# Patient Record
Sex: FEMALE | Race: WHITE | Employment: FULL TIME | URBAN - METROPOLITAN AREA
[De-identification: names, ages, dates, MRNs, and addresses within clinical notes are randomized per-mention and may not be internally consistent; named-entity substitution may affect disease eponyms.]

---

## 2018-01-26 PROBLEM — Z34.82 ENCOUNTER FOR SUPERVISION OF OTHER NORMAL PREGNANCY, SECOND TRIMESTER: Status: ACTIVE | Noted: 2018-01-26

## 2018-06-10 PROBLEM — O26.843 UTERINE SIZE-DATE DISCREPANCY IN THIRD TRIMESTER: Status: ACTIVE | Noted: 2018-06-10

## 2018-07-25 ENCOUNTER — HOSPITAL ENCOUNTER (INPATIENT)
Age: 28
LOS: 2 days | Discharge: HOME | End: 2018-07-27
Attending: OBSTETRICS & GYNECOLOGY | Admitting: OBSTETRICS & GYNECOLOGY
Payer: COMMERCIAL

## 2018-07-25 ENCOUNTER — ANESTHESIA EVENT (OUTPATIENT)
Dept: LABOR AND DELIVERY | Age: 28
End: 2018-07-25
Payer: COMMERCIAL

## 2018-07-25 ENCOUNTER — ANESTHESIA (OUTPATIENT)
Dept: LABOR AND DELIVERY | Age: 28
End: 2018-07-25
Payer: COMMERCIAL

## 2018-07-25 PROBLEM — Z37.9 NORMAL LABOR: Status: ACTIVE | Noted: 2018-07-25

## 2018-07-25 PROBLEM — O47.03 PREMATURE UTERINE CONTRACTIONS, ANTEPARTUM, THIRD TRIMESTER: Status: ACTIVE | Noted: 2018-07-25

## 2018-07-25 LAB
ABO + RH BLD: NORMAL
BLOOD GROUP ANTIBODIES SERPL: NORMAL
ERYTHROCYTE [DISTWIDTH] IN BLOOD BY AUTOMATED COUNT: 13 % (ref 11.9–14.6)
HCT VFR BLD AUTO: 35 % (ref 35.8–46.3)
HGB BLD-MCNC: 11.5 G/DL (ref 11.7–15.4)
MCH RBC QN AUTO: 31.1 PG (ref 26.1–32.9)
MCHC RBC AUTO-ENTMCNC: 32.9 G/DL (ref 31.4–35)
MCV RBC AUTO: 94.6 FL (ref 79.6–97.8)
PLATELET # BLD AUTO: 171 K/UL (ref 150–450)
PMV BLD AUTO: 10.3 FL (ref 10.8–14.1)
RBC # BLD AUTO: 3.7 M/UL (ref 4.05–5.25)
SPECIMEN EXP DATE BLD: NORMAL
WBC # BLD AUTO: 11.7 K/UL (ref 4.3–11.1)

## 2018-07-25 PROCEDURE — 74011250636 HC RX REV CODE- 250/636: Performed by: OBSTETRICS & GYNECOLOGY

## 2018-07-25 PROCEDURE — 0KQM0ZZ REPAIR PERINEUM MUSCLE, OPEN APPROACH: ICD-10-PCS | Performed by: OBSTETRICS & GYNECOLOGY

## 2018-07-25 PROCEDURE — 75410000002 HC LABOR FEE PER 1 HR: Performed by: OBSTETRICS & GYNECOLOGY

## 2018-07-25 PROCEDURE — 75410000000 HC DELIVERY VAGINAL/SINGLE: Performed by: OBSTETRICS & GYNECOLOGY

## 2018-07-25 PROCEDURE — A4300 CATH IMPL VASC ACCESS PORTAL: HCPCS | Performed by: NURSE ANESTHETIST, CERTIFIED REGISTERED

## 2018-07-25 PROCEDURE — 99284 EMERGENCY DEPT VISIT MOD MDM: CPT

## 2018-07-25 PROCEDURE — 85027 COMPLETE CBC AUTOMATED: CPT | Performed by: OBSTETRICS & GYNECOLOGY

## 2018-07-25 PROCEDURE — 74011258636 HC RX REV CODE- 258/636: Performed by: OBSTETRICS & GYNECOLOGY

## 2018-07-25 PROCEDURE — 65270000029 HC RM PRIVATE

## 2018-07-25 PROCEDURE — 77030014125 HC TY EPDRL BBMI -B: Performed by: NURSE ANESTHETIST, CERTIFIED REGISTERED

## 2018-07-25 PROCEDURE — 76060000078 HC EPIDURAL ANESTHESIA: Performed by: OBSTETRICS & GYNECOLOGY

## 2018-07-25 PROCEDURE — 4A1HXCZ MONITORING OF PRODUCTS OF CONCEPTION, CARDIAC RATE, EXTERNAL APPROACH: ICD-10-PCS | Performed by: OBSTETRICS & GYNECOLOGY

## 2018-07-25 PROCEDURE — 74011250636 HC RX REV CODE- 250/636

## 2018-07-25 PROCEDURE — 10907ZC DRAINAGE OF AMNIOTIC FLUID, THERAPEUTIC FROM PRODUCTS OF CONCEPTION, VIA NATURAL OR ARTIFICIAL OPENING: ICD-10-PCS | Performed by: OBSTETRICS & GYNECOLOGY

## 2018-07-25 PROCEDURE — 75410000003 HC RECOV DEL/VAG/CSECN EA 0.5 HR: Performed by: OBSTETRICS & GYNECOLOGY

## 2018-07-25 PROCEDURE — 86900 BLOOD TYPING SEROLOGIC ABO: CPT | Performed by: OBSTETRICS & GYNECOLOGY

## 2018-07-25 PROCEDURE — 59025 FETAL NON-STRESS TEST: CPT

## 2018-07-25 RX ORDER — LIDOCAINE HYDROCHLORIDE 20 MG/ML
JELLY TOPICAL
Status: DISCONTINUED | OUTPATIENT
Start: 2018-07-25 | End: 2018-07-26 | Stop reason: HOSPADM

## 2018-07-25 RX ORDER — LIDOCAINE HYDROCHLORIDE 10 MG/ML
1 INJECTION INFILTRATION; PERINEURAL
Status: DISCONTINUED | OUTPATIENT
Start: 2018-07-25 | End: 2018-07-26 | Stop reason: HOSPADM

## 2018-07-25 RX ORDER — ONDANSETRON 2 MG/ML
4 INJECTION INTRAMUSCULAR; INTRAVENOUS
Status: DISCONTINUED | OUTPATIENT
Start: 2018-07-25 | End: 2018-07-26 | Stop reason: HOSPADM

## 2018-07-25 RX ORDER — FAMOTIDINE 20 MG/1
20 TABLET, FILM COATED ORAL ONCE
Status: DISCONTINUED | OUTPATIENT
Start: 2018-07-25 | End: 2018-07-26 | Stop reason: HOSPADM

## 2018-07-25 RX ORDER — SODIUM CHLORIDE 0.9 % (FLUSH) 0.9 %
5-10 SYRINGE (ML) INJECTION EVERY 8 HOURS
Status: DISCONTINUED | OUTPATIENT
Start: 2018-07-25 | End: 2018-07-26

## 2018-07-25 RX ORDER — OXYTOCIN/RINGER'S LACTATE 30/500 ML
.5-2 PLASTIC BAG, INJECTION (ML) INTRAVENOUS
Status: DISCONTINUED | OUTPATIENT
Start: 2018-07-26 | End: 2018-07-26

## 2018-07-25 RX ORDER — MINERAL OIL
120 OIL (ML) ORAL
Status: DISCONTINUED | OUTPATIENT
Start: 2018-07-25 | End: 2018-07-26 | Stop reason: HOSPADM

## 2018-07-25 RX ORDER — DEXTROSE, SODIUM CHLORIDE, SODIUM LACTATE, POTASSIUM CHLORIDE, AND CALCIUM CHLORIDE 5; .6; .31; .03; .02 G/100ML; G/100ML; G/100ML; G/100ML; G/100ML
125 INJECTION, SOLUTION INTRAVENOUS CONTINUOUS
Status: DISCONTINUED | OUTPATIENT
Start: 2018-07-25 | End: 2018-07-26

## 2018-07-25 RX ORDER — ROPIVACAINE HYDROCHLORIDE 2 MG/ML
INJECTION, SOLUTION EPIDURAL; INFILTRATION; PERINEURAL
Status: DISCONTINUED | OUTPATIENT
Start: 2018-07-25 | End: 2018-07-26 | Stop reason: HOSPADM

## 2018-07-25 RX ORDER — OXYTOCIN/RINGER'S LACTATE 15/250 ML
250 PLASTIC BAG, INJECTION (ML) INTRAVENOUS ONCE
Status: DISCONTINUED | OUTPATIENT
Start: 2018-07-25 | End: 2018-07-25 | Stop reason: SDUPTHER

## 2018-07-25 RX ORDER — BUTORPHANOL TARTRATE 1 MG/ML
1 INJECTION INTRAMUSCULAR; INTRAVENOUS
Status: DISCONTINUED | OUTPATIENT
Start: 2018-07-25 | End: 2018-07-26 | Stop reason: HOSPADM

## 2018-07-25 RX ORDER — OXYTOCIN/0.9 % SODIUM CHLORIDE 15/250 ML
250 PLASTIC BAG, INJECTION (ML) INTRAVENOUS ONCE
Status: DISCONTINUED | OUTPATIENT
Start: 2018-07-25 | End: 2018-07-26

## 2018-07-25 RX ORDER — SODIUM CHLORIDE 0.9 % (FLUSH) 0.9 %
5-10 SYRINGE (ML) INJECTION EVERY 8 HOURS
Status: DISCONTINUED | OUTPATIENT
Start: 2018-07-25 | End: 2018-07-26 | Stop reason: HOSPADM

## 2018-07-25 RX ORDER — SODIUM CHLORIDE 0.9 % (FLUSH) 0.9 %
5-10 SYRINGE (ML) INJECTION AS NEEDED
Status: DISCONTINUED | OUTPATIENT
Start: 2018-07-25 | End: 2018-07-26 | Stop reason: HOSPADM

## 2018-07-25 RX ORDER — SODIUM CHLORIDE 0.9 % (FLUSH) 0.9 %
5-10 SYRINGE (ML) INJECTION AS NEEDED
Status: DISCONTINUED | OUTPATIENT
Start: 2018-07-25 | End: 2018-07-26

## 2018-07-25 RX ADMIN — OXYTOCIN 4 MILLI-UNITS/MIN: 10 INJECTION, SOLUTION INTRAMUSCULAR; INTRAVENOUS at 23:52

## 2018-07-25 RX ADMIN — SODIUM CHLORIDE, SODIUM LACTATE, POTASSIUM CHLORIDE, CALCIUM CHLORIDE, AND DEXTROSE MONOHYDRATE 125 ML/HR: 600; 310; 30; 20; 5 INJECTION, SOLUTION INTRAVENOUS at 20:40

## 2018-07-25 RX ADMIN — SODIUM CHLORIDE, SODIUM LACTATE, POTASSIUM CHLORIDE, AND CALCIUM CHLORIDE 1000 ML: 600; 310; 30; 20 INJECTION, SOLUTION INTRAVENOUS at 20:40

## 2018-07-25 RX ADMIN — ROPIVACAINE HYDROCHLORIDE 10 ML/HR: 2 INJECTION, SOLUTION EPIDURAL; INFILTRATION; PERINEURAL at 21:32

## 2018-07-25 NOTE — IP AVS SNAPSHOT
Valeria Hensley 
 
 
 [id number] [facility] [address] 
[phone] Patient: Nnamdi Alvarez MRN: [id number] BGS:[id number] A check margie indicates which time of day the medication should be taken. My Medications START taking these medications Instructions Each Dose to Equal  
 Morning Noon Evening Bedtime  
 ibuprofen 800 mg tablet Commonly known as:  MOTRIN Your last dose was: Your next dose is: Take 1 Tab by mouth every six (6) hours as needed for Pain. 800 mg CONTINUE taking these medications Instructions Each Dose to Equal  
 Morning Noon Evening Bedtime  
 prenatal 25-iron-folate 6-dha 30 mg iron-1mg -200 mg Cap Your last dose was: Your next dose is: Take  by mouth. [facility] Where to Get Your Medications These medications were sent to [address], [address], [facility] [address] Phone:  [phone]  
  ibuprofen 800 mg tablet

## 2018-07-25 NOTE — H&P
Chief Complaint: Contractions      32 y.o. female at 39w3d  weeks gestation who is seen for labor check. Pt notes good FM. She denies VB, LOF, preeclamptic symptoms or UTI symptoms. HISTORY:    History   Sexual Activity    Sexual activity: Yes    Partners: Male    Birth control/ protection: None     Patient's last menstrual period was 10/14/2017. Social History     Social History    Marital status:      Spouse name: N/A    Number of children: N/A    Years of education: N/A     Occupational History    Not on file. Social History Main Topics    Smoking status: Former Smoker     Packs/day: 1.00    Smokeless tobacco: Never Used    Alcohol use No    Drug use: No    Sexual activity: Yes     Partners: Male     Birth control/ protection: None     Other Topics Concern    Not on file     Social History Narrative       History reviewed. No pertinent surgical history. History reviewed. No pertinent past medical history. ROS:  An 8 point review of symptoms negative except for chief complaint as described above. PHYSICAL EXAM:  Blood pressure 124/70, pulse 90, temperature 98.1 °F (36.7 °C), resp. rate 18, height 5' 3\" (1.6 m), last menstrual period 10/14/2017, unknown if currently breastfeeding. Constitutional: The patient appears well, alert, oriented x 3. GI: Abdomen soft, nontender, no guarding  No fundal tenderness  Lower ext: no edema, neg lucy's, reflexes +2  Psychiatric:Mood/ Affect: appropriate  Genitourinary: SVE: 3cm/60%/vtx/-3  FHT: Category 1 with mod variability and + accels; reactive  TOCO: ctx irregular    I personally reviewed pt's medical record including relevant labs and ultrasounds    Assessment/Plan:  Pt to ambulate for 1 hour. Will recheck her cervix at this point. If cervix is unchanged will discharge to home with labor precautions. Pt to f/u with her PObP.

## 2018-07-25 NOTE — IP AVS SNAPSHOT
Summary of Care Report The Summary of Care report has been created to help improve care coordination. Users with access to [employer] or [employer] [facility] (Web-based application) may access additional patient information including the Discharge Summary. If you are not currently a [facility] user and need more information, please call the number listed below in the [place] [address] section and ask to be connected with Medical Records. Facility Information Name Address Phone [address] [place] [address] [address] [place] [id number] [phone] Patient Information Patient Name Sex  Davion Degroot ([id number]) Female 1990 Discharge Information Admitting Provider Service Area Unit Aneta Swan MD / [address] [facility] 4 Mother Infant / [phone] Discharge Provider Discharge Date/Time Discharge Disposition Destination (none) 2018 (Pending) AHR (none) Patient Language Language ENGLISH [13] Hospital Problems as of 2018  Reviewed: 2018 10:15 AM by Jessica Justice MD  
  
  
  
 Class Noted - Resolved Last Modified POA Active Problems Premature uterine contractions, antepartum, third trimester  2018 - Present 2018 by Jessica Justice MD No  
  Entered by Aneta Swan MD  
  * (Principal)Normal labor  2018 - Present 2018 by Jessica Justice MD Yes Entered by Aneta Swan MD  
  
Non-Hospital Problems as of 2018  Reviewed: 2018 10:15 AM by Jessica Justice MD  
  
  
  
 Class Noted - Resolved Last Modified Active Problems   Encounter for supervision of other normal pregnancy, second trimester  2018 - Present 2018 by Jessica Justice MD  
  Entered by Jessica Justice MD  
  Overview Signed 2018 11:22 PM by Jessica Justice MD  
 32yo  w/ hx of  with 2nd deg lac, son \"Jose\" Will need early GTT due to BMI States had flu shot in Oct or Nov 
Rh pos Uterine size-date discrepancy in third trimester  6/10/2018 - Present 2018 by Milli Benson MD  
  Entered by Kisha Estrella MD  
  Overview Signed 2018  3:29 PM by Milli Benson MD  
   2018 38w1d US = 8lb4oz, HC = 26%, AC=98%, AF=17 You are allergic to the following No active allergies Current Discharge Medication List  
  
START taking these medications Dose & Instructions Dispensing Information Comments  
 ibuprofen 800 mg tablet Commonly known as:  MOTRIN Dose:  800 mg Take 1 Tab by mouth every six (6) hours as needed for Pain. Quantity:  60 Tab Refills:  2 CONTINUE these medications which have NOT CHANGED Dose & Instructions Dispensing Information Comments  
 prenatal 25-iron-folate 6-dha 30 mg iron-1mg -200 mg Cap Take  by mouth. [facility] Refills:  0 Current Immunizations Name Date Influenza Vaccine 10/13/2016 Tdap 2018, 2016 Surgery Information ID Date/Time Status Primary Surgeon All Procedures Location [id number] 2018 Complete   ZZANESTHESIA SFE - DO NOT SCHEDULE Follow-up Information Follow up With Details Comments Contact Info None   None (395) Patient stated that they have no PCP Discharge Instructions Vaginal Childbirth: Care Instructions Your Care Instructions Your body will slowly heal in the next few weeks. It is easy to get too tired and overwhelmed during the first weeks after your baby is born. Changes in your hormones can shift your mood without warning. You may find it hard to meet the extra demands on your energy and time. Take it easy on yourself. Follow-up care is a key part of your treatment and safety.  Be sure to make and go to all appointments, and call your doctor if you are having problems. It's also a good idea to know your test results and keep a list of the medicines you take. How can you care for yourself at home? · Vaginal bleeding and cramps ¨ After delivery, you will have a bloody discharge from the vagina. This will turn pink within a week and then white or yellow after about 10 days. It may last for 2 to 4 weeks or longer, until the uterus has healed. Use pads instead of tampons until you stop bleeding. ¨ Do not worry if you pass some blood clots, as long as they are smaller than a golf ball. If you have a tear or stitches in your vaginal area, change the pad at least every 4 hours to prevent soreness and infection. ¨ You may have cramps for the first few days after childbirth. These are normal and occur as the uterus shrinks to normal size. Take an over-the-counter pain medicine, such as acetaminophen (Tylenol), ibuprofen (Advil, Motrin), or naproxen (Aleve), for cramps. Read and follow all instructions on the label. Do not take aspirin, because it can cause more bleeding. ¨ Do not take two or more pain medicines at the same time unless the doctor told you to. Many pain medicines have acetaminophen, which is Tylenol. Too much acetaminophen (Tylenol) can be harmful. · Stitches ¨ If you have stitches, they will dissolve on their own and do not need to be removed. Follow your doctor's instructions for cleaning the stitched area. ¨ Put ice or a cold pack on your painful area for 10 to 20 minutes at a time, several times a day, for the first few days. Put a thin cloth between the ice and your skin. ¨ Sit in a few inches of warm water (sitz bath) 3 times a day and after bowel movements. The warm water helps with pain and itching. If you do not have a tub, a warm shower might help. · Breast fullness ¨ Your breasts may overfill (engorge) in the first few days after delivery. To help milk flow and to relieve pain, warm your breasts in the shower or by using warm, moist towels before nursing. ¨ If you are not nursing, do not put warmth on your breasts or touch your breasts. Wear a tight bra or sports bra and use ice until the fullness goes away. This usually takes 2 to 3 days. ¨ Put ice or a cold pack on your breast after nursing to reduce swelling and pain. Put a thin cloth between the ice and your skin. · Activity ¨ Eat a balanced diet. Do not try to lose weight by cutting calories. Keep taking your prenatal vitamins, or take a multivitamin. ¨ Get as much rest as you can. Try to take naps when your baby sleeps during the day. ¨ Get some exercise every day. But do not do any heavy exercise until your doctor says it is okay. ¨ Wait until you are healed (about 4 to 6 weeks) before you have sexual intercourse. Your doctor will tell you when it is okay to have sex. ¨ Talk to your doctor about birth control. You can get pregnant even before your period returns. Also, you can get pregnant while you are breastfeeding. · Mental health ¨ It is normal to have some sadness, anxiety, sleeplessness, and mood swings after you go home. If you feel upset or hopeless for more than a few days or are having trouble doing the things you need to do, talk to your doctor. · Constipation and hemorrhoids ¨ Drink plenty of fluids, enough so that your urine is light yellow or clear like water. If you have kidney, heart, or liver disease and have to limit fluids, talk with your doctor before you increase the amount of fluids you drink. ¨ Eat plenty of fiber each day. Have a bran muffin or bran cereal for breakfast, and try eating a piece of fruit for a mid-afternoon snack. ¨ For painful, itchy hemorrhoids, put ice or a cold pack on the area several times a day for 10 minutes at a time.  Follow this by putting a warm compress on the area for another 10 to 20 minutes or by sitting in a shallow, warm bath. When should you call for help? Call 911 anytime you think you may need emergency care. For example, call if: 
  · You passed out (lost consciousness).  
 Call your doctor now or seek immediate medical care if: 
  · You have severe vaginal bleeding.  
  · You are dizzy or lightheaded, or you feel like you may faint.  
  · You have a fever.  
  · You have new or more pain in your belly or pelvis.  
 Watch closely for changes in your health, and be sure to contact your doctor if: 
  · Your vaginal bleeding seems to be getting heavier.  
  · You have new or worse vaginal discharge.  
  · You feel sad, anxious, or hopeless for more than a few days.  
  · You do not get better as expected. Where can you learn more? Go to http://ruslan-stella.info/. Enter C213 in the search box to learn more about \"Vaginal Childbirth: Care Instructions. \" Current as of: November 21, 2017 Content Version: 11.7 © 3819-2209 [employer]. Care instructions adapted under license by [employer] (which disclaims liability or warranty for this information). If you have questions about a medical condition or this instruction, always ask your healthcare professional. [address] [address] any warranty or liability for your use of this information. Chart Review Routing History No Routing History on File

## 2018-07-25 NOTE — IP AVS SNAPSHOT
Therese Styles 
 
 
 [address] [facility] [address] 
[phone] Patient: Jeffy León MRN: [id number] S:[birth year] About your hospitalization You were admitted on:  July 25, 2018 You last received care in the:  [address] [facility] You were discharged on:  July 27, 2018 Why you were hospitalized Your primary diagnosis was:  Normal Labor Your diagnoses also included:  Premature Uterine Contractions, Antepartum, Third Trimester Follow-up Information Follow up With Details Comments Contact Info None   None ([phone]) Patient stated that they have no PCP Your Scheduled Appointments Monday July 30, 2018  9:20 AM EDT Return OB with Justin Cabrera, [address] ([address] [address] [phone] Discharge Orders None A check margie indicates which time of day the medication should be taken. My Medications START taking these medications Instructions Each Dose to Equal  
 Morning Noon Evening Bedtime  
 ibuprofen 800 mg tablet Commonly known as:  MOTRIN Your last dose was: Your next dose is: Take 1 Tab by mouth every six (6) hours as needed for Pain. 800 mg CONTINUE taking these medications Instructions Each Dose to Equal  
 Morning Noon Evening Bedtime  
 prenatal 25-iron-folate 6-dha 30 mg iron-1mg -200 mg Cap Your last dose was: Your next dose is: Take  by mouth. [facility] Where to Get Your Medications These medications were sent to [address], [address] [facility] [place] [address] Drive, [facility] [address] [address] Phone:  [phone]  
  ibuprofen 800 mg tablet Discharge Instructions Vaginal Childbirth: Care Instructions Your Care Instructions Your body will slowly heal in the next few weeks. It is easy to get too tired and overwhelmed during the first weeks after your baby is born. Changes in your hormones can shift your mood without warning. You may find it hard to meet the extra demands on your energy and time. Take it easy on yourself. Follow-up care is a key part of your treatment and safety. Be sure to make and go to all appointments, and call your doctor if you are having problems. It's also a good idea to know your test results and keep a list of the medicines you take. How can you care for yourself at home? · Vaginal bleeding and cramps ¨ After delivery, you will have a bloody discharge from the vagina. This will turn pink within a week and then white or yellow after about 10 days. It may last for 2 to 4 weeks or longer, until the uterus has healed. Use pads instead of tampons until you stop bleeding. ¨ Do not worry if you pass some blood clots, as long as they are smaller than a golf ball. If you have a tear or stitches in your vaginal area, change the pad at least every 4 hours to prevent soreness and infection. ¨ You may have cramps for the first few days after childbirth. These are normal and occur as the uterus shrinks to normal size. Take an over-the-counter pain medicine, such as acetaminophen (Tylenol), ibuprofen (Advil, Motrin), or naproxen (Aleve), for cramps. Read and follow all instructions on the label. Do not take aspirin, because it can cause more bleeding. ¨ Do not take two or more pain medicines at the same time unless the doctor told you to. Many pain medicines have acetaminophen, which is Tylenol. Too much acetaminophen (Tylenol) can be harmful. · Stitches ¨ If you have stitches, they will dissolve on their own and do not need to be removed. Follow your doctor's instructions for cleaning the stitched area.  
¨ Put ice or a cold pack on your painful area for 10 to 20 minutes at a time, several times a day, for the first few days. Put a thin cloth between the ice and your skin. ¨ Sit in a few inches of warm water (sitz bath) 3 times a day and after bowel movements. The warm water helps with pain and itching. If you do not have a tub, a warm shower might help. · Breast fullness ¨ Your breasts may overfill (engorge) in the first few days after delivery. To help milk flow and to relieve pain, warm your breasts in the shower or by using warm, moist towels before nursing. ¨ If you are not nursing, do not put warmth on your breasts or touch your breasts. Wear a tight bra or sports bra and use ice until the fullness goes away. This usually takes 2 to 3 days. ¨ Put ice or a cold pack on your breast after nursing to reduce swelling and pain. Put a thin cloth between the ice and your skin. · Activity ¨ Eat a balanced diet. Do not try to lose weight by cutting calories. Keep taking your prenatal vitamins, or take a multivitamin. ¨ Get as much rest as you can. Try to take naps when your baby sleeps during the day. ¨ Get some exercise every day. But do not do any heavy exercise until your doctor says it is okay. ¨ Wait until you are healed (about 4 to 6 weeks) before you have sexual intercourse. Your doctor will tell you when it is okay to have sex. ¨ Talk to your doctor about birth control. You can get pregnant even before your period returns. Also, you can get pregnant while you are breastfeeding. · Mental health ¨ It is normal to have some sadness, anxiety, sleeplessness, and mood swings after you go home. If you feel upset or hopeless for more than a few days or are having trouble doing the things you need to do, talk to your doctor. · Constipation and hemorrhoids ¨ Drink plenty of fluids, enough so that your urine is light yellow or clear like water.  If you have kidney, heart, or liver disease and have to limit fluids, talk with your doctor before you increase the amount of fluids you drink. ¨ Eat plenty of fiber each day. Have a bran muffin or bran cereal for breakfast, and try eating a piece of fruit for a mid-afternoon snack. ¨ For painful, itchy hemorrhoids, put ice or a cold pack on the area several times a day for 10 minutes at a time. Follow this by putting a warm compress on the area for another 10 to 20 minutes or by sitting in a shallow, warm bath. When should you call for help? Call 911 anytime you think you may need emergency care. For example, call if: 
  · You passed out (lost consciousness).  
 Call your doctor now or seek immediate medical care if: 
  · You have severe vaginal bleeding.  
  · You are dizzy or lightheaded, or you feel like you may faint.  
  · You have a fever.  
  · You have new or more pain in your belly or pelvis.  
 Watch closely for changes in your health, and be sure to contact your doctor if: 
  · Your vaginal bleeding seems to be getting heavier.  
  · You have new or worse vaginal discharge.  
  · You feel sad, anxious, or hopeless for more than a few days.  
  · You do not get better as expected. Where can you learn more? Go to http://ruslan-stella.info/. Enter U075 in the search box to learn more about \"Vaginal Childbirth: Care Instructions. \" Current as of: November 21, 2017 Content Version: 11.7 © 3968-7060 [employer]. Care instructions adapted under license by [employer] (which disclaims liability or warranty for this information). If you have questions about a medical condition or this instruction, always ask your healthcare professional. [place] [address] any warranty or liability for your use of this information. [employer] Announcement We are excited to announce that we are making your provider's discharge notes available to you in [employer].   You will see these notes when they are completed and signed by the physician that discharged you from your recent hospital stay. If you have any questions or concerns about any information you see in Sonico, please call the Health Information Department where you were seen or reach out to your Primary Care Provider for more information about your plan of care. Introducing [facility] & HEALTH SERVICES! [employer] introduces Sonico patient portal. Now you can access parts of your medical record, email your doctor's office, and request medication refills online. 1. In your internet browser, go to [email or web address] 2. Click on the First Time User? Click Here link in the Sign In box. You will see the New Member Sign Up page. 3. Enter your Sonico Access Code exactly as it appears below. You will not need to use this code after youve completed the sign-up process. If you do not sign up before the expiration date, you must request a new code. · Sonico Access Code: [email or web address] Expires: 9/20/2018  9:05 AM 
 
4. Enter the last four digits of your Social Security Number (xxxx) and Date of Birth (mm/dd/yyyy) as indicated and click Submit. You will be taken to the next sign-up page. 5. Create a Sonico ID. This will be your Sonico login ID and cannot be changed, so think of one that is secure and easy to remember. 6. Create a Sonico password. You can change your password at any time. 7. Enter your Password Reset Question and Answer. This can be used at a later time if you forget your password. 8. Enter your e-mail address. You will receive e-mail notification when new information is available in [address] 9. Click Sign Up. You can now view and download portions of your medical record. 10. Click the Download Summary menu link to download a portable copy of your medical information.  
 
If you have questions, please visit the Frequently Asked Questions section of the [employer]. Remember, Offsite Care Resourceshart is NOT to be used for urgent needs. For medical emergencies, dial 911. Now available from your iPhone and Android! Introducing Hernandez Day As a Kaylie Miller patient, I wanted to make you aware of our electronic visit tool called Hernandez Day. Oration 24/7 allows you to connect within minutes with a medical provider 24 hours a day, seven days a week via a mobile device or tablet or logging into a secure website from your computer. You can access Hernandez Day from anywhere in the [place]. A virtual visit might be right for you when you have a simple condition and feel like you just dont want to get out of bed, or cant get away from work for an appointment, when your regular [place] Angela provider is not available (evenings, weekends or holidays), or when youre out of town and need minor care. Electronic visits cost only $49 and if the Oration 24/7 provider determines a prescription is needed to treat your condition, one can be electronically transmitted to a nearby pharmacy*. Please take a moment to enroll today if you have not already done so. The enrollment process is free and takes just a few minutes. To enroll, please download the Oration 24/[employer] lilli to your tablet or phone, or visit [email or web address] to enroll on your computer. And, as an [address] [place] [address] patient with a [employer] account, the results of your visits will be scanned into your electronic medical record and your primary care provider will be able to view the scanned results. We urge you to continue to see your regular Kaylie Miller provider for your ongoing medical care. And while your primary care provider may not be the one available when you seek a Hernandez Day virtual visit, the peace of mind you get from getting a real diagnosis real time can be priceless. For more information on Hernandez Day, view our Frequently Asked Questions (FAQs) at [email or web address]. Sincerely, 
 
Jaspal Patel MD 
Chief Medical Officer [employer] *:  certain medications cannot be prescribed via Hernandez Day Providers Seen During Your Hospitalization Provider Specialty Primary office phone Alan Beasley MD Obstetrics & Gynecology [phone] Your Primary Care Physician (PCP) Primary Care Physician Office Phone Office Fax NONE ** None ** ** None ** You are allergic to the following No active allergies Recent Documentation Height Breastfeeding? OB Status Smoking Status 1.6 m Yes Recent pregnancy Former Smoker Emergency Contacts Name Discharge Info Relation Home Work Mobile [facility]  Mother [14] [phone] Patient Belongings The following personal items are in your possession at time of discharge: 
  Dental Appliances: None  Visual Aid: None      Home Medications: None   Jewelry: None  Clothing: At bedside, Footwear, Jacket/Coat, Pants, Shirt, Undergarments    Other Valuables: At bedside, Cell Phone  Personal Items Sent to Safe: declined Please provide this summary of care documentation to your next provider. Signatures-by signing, you are acknowledging that this After Visit Summary has been reviewed with you and you have received a copy. Patient Signature:  ____________________________________________________________ Date:  ____________________________________________________________  
  
[employer] Provider Signature:  ____________________________________________________________ Date:  ____________________________________________________________

## 2018-07-26 LAB
CA-I BLD-MCNC: 1.44 MMOL/L (ref 1.12–1.32)
CA-I BLD-MCNC: 1.58 MMOL/L (ref 1.12–1.32)
GLUCOSE BLD STRIP.AUTO-MCNC: 100 MG/DL (ref 65–100)
GLUCOSE BLD STRIP.AUTO-MCNC: 96 MG/DL (ref 65–100)
HCO3 BLD-SCNC: 22.1 MMOL/L (ref 22–26)
HCO3 BLD-SCNC: 22.3 MMOL/L (ref 22–26)
PCO2 BLD: 43.2 MMHG (ref 35–45)
PCO2 BLD: 46.5 MMHG (ref 35–45)
PH BLD: 7.29 [PH] (ref 7.35–7.45)
PH BLD: 7.32 [PH] (ref 7.35–7.45)
PO2 BLD: 29 MMHG (ref 80–105)
PO2 BLD: 32 MMHG (ref 80–105)
POTASSIUM BLD-SCNC: 4.5 MMOL/L (ref 3.5–5.1)
POTASSIUM BLD-SCNC: 5.3 MMOL/L (ref 3.5–5.1)
SAO2 % BLD: 50 % (ref 95–98)
SAO2 % BLD: 55 % (ref 95–98)
SODIUM BLD-SCNC: 136 MMOL/L (ref 136–145)
SODIUM BLD-SCNC: 139 MMOL/L (ref 136–145)

## 2018-07-26 PROCEDURE — 74011250637 HC RX REV CODE- 250/637: Performed by: OBSTETRICS & GYNECOLOGY

## 2018-07-26 PROCEDURE — 84295 ASSAY OF SERUM SODIUM: CPT

## 2018-07-26 PROCEDURE — 82803 BLOOD GASES ANY COMBINATION: CPT

## 2018-07-26 PROCEDURE — 65270000029 HC RM PRIVATE

## 2018-07-26 PROCEDURE — 82330 ASSAY OF CALCIUM: CPT

## 2018-07-26 RX ORDER — IBUPROFEN 800 MG/1
800 TABLET ORAL EVERY 8 HOURS
Status: DISCONTINUED | OUTPATIENT
Start: 2018-07-26 | End: 2018-07-27 | Stop reason: HOSPADM

## 2018-07-26 RX ORDER — HYDROCODONE BITARTRATE AND ACETAMINOPHEN 5; 325 MG/1; MG/1
1 TABLET ORAL
Status: DISCONTINUED | OUTPATIENT
Start: 2018-07-26 | End: 2018-07-27 | Stop reason: HOSPADM

## 2018-07-26 RX ORDER — IBUPROFEN 800 MG/1
800 TABLET ORAL EVERY 8 HOURS
Status: DISCONTINUED | OUTPATIENT
Start: 2018-07-26 | End: 2018-07-26

## 2018-07-26 RX ORDER — NALOXONE HYDROCHLORIDE 0.4 MG/ML
0.4 INJECTION, SOLUTION INTRAMUSCULAR; INTRAVENOUS; SUBCUTANEOUS AS NEEDED
Status: DISCONTINUED | OUTPATIENT
Start: 2018-07-26 | End: 2018-07-27 | Stop reason: HOSPADM

## 2018-07-26 RX ORDER — ONDANSETRON 2 MG/ML
4 INJECTION INTRAMUSCULAR; INTRAVENOUS
Status: DISCONTINUED | OUTPATIENT
Start: 2018-07-26 | End: 2018-07-27 | Stop reason: HOSPADM

## 2018-07-26 RX ORDER — DIPHENHYDRAMINE HCL 25 MG
25 CAPSULE ORAL
Status: DISCONTINUED | OUTPATIENT
Start: 2018-07-26 | End: 2018-07-27 | Stop reason: HOSPADM

## 2018-07-26 RX ADMIN — IBUPROFEN 800 MG: 800 TABLET ORAL at 11:47

## 2018-07-26 RX ADMIN — IBUPROFEN 800 MG: 800 TABLET ORAL at 03:53

## 2018-07-26 RX ADMIN — IBUPROFEN 800 MG: 800 TABLET ORAL at 19:56

## 2018-07-26 NOTE — LACTATION NOTE
This note was copied from a baby's chart. Lactation visit with 2nd time mom. Mom concerned that infant is not getting enough. Said she exclusively pumped for her last baby for 4 months. Right now mom is breastfeeding and giving 10 mL formula. Observed infant in football on left side for about 5 minutes. Infant has great latch. Discussed with mom colostrum expectations in first 24 hours and offered to set up a pump, mom accepted. Pump set up in room, reviewed parts and demonstrated collections, verbalized understanding. Lactation to follow up tomorrow.

## 2018-07-26 NOTE — PROGRESS NOTES
Post-Partum Day Number 1 Progress Note  Nery Prather  [id number]    Patient doing well post-partum without significant complaint. Voiding without difficulty, normal lochia. Vitals:  Patient Vitals for the past 8 hrs:   BP Temp Pulse Resp SpO2   18 0710 124/58 98 °F (36.7 °C) 76 16 -   18 0401 131/75 99.6 °F (37.6 °C) (!) 104 16 100 %   18 0304 130/64 98.8 °F (37.1 °C) (!) 105 16 100 %   18 0205 119/78 - (!) 109 - -     Temp (24hrs), Av.7 °F (37.1 °C), Min:98 °F (36.7 °C), Max:99.6 °F (37.6 °C)      Vital signs stable, afebrile. Exam:  Patient without distress. Abdomen soft, fundus firm at level of umbilicus, nontender               Labs:   Recent Results (from the past 24 hour(s))   CBC W/O DIFF    Collection Time: 18  8:42 PM   Result Value Ref Range    WBC 11.7 (H) 4.3 - 11.1 K/uL    RBC 3.70 (L) 4.05 - 5.25 M/uL    HGB 11.5 (L) 11.7 - 15.4 g/dL    HCT 35.0 (L) 35.8 - 46.3 %    MCV 94.6 79.6 - 97.8 FL    MCH 31.1 26.1 - 32.9 PG    MCHC 32.9 31.4 - 35.0 g/dL    RDW 13.0 11.9 - 14.6 %    PLATELET 150 655 - 337 K/uL    MPV 10.3 (L) 10.8 - 14.1 FL   TYPE & SCREEN    Collection Time: 18  8:42 PM   Result Value Ref Range    Crossmatch Expiration 2018     ABO/Rh(D) Michelle Lazo POSITIVE     Antibody screen NEG        Assessment and Plan:  Patient appears to be having uncomplicated post-partum course. Continue routine perineal care and maternal education. Plan discharge tomorrow if no problems occur.

## 2018-07-26 NOTE — ANESTHESIA POSTPROCEDURE EVALUATION
Post-Anesthesia Evaluation and Assessment    Patient: Kiesha Castillo MRN: [id number]  SSN: [id number]    YOB: 1990  Age: 32 y.o. Sex: female       Cardiovascular Function/Vital Signs  Visit Vitals    /64 (BP 1 Location: Left arm, BP Patient Position: At rest)    Pulse (!) 105    Temp 37.1 °C (98.8 °F)    Resp 16    Ht 5' 3\" (1.6 m)    SpO2 100%    Breastfeeding Yes       Patient is status post epidural anesthesia for * No procedures listed *. Nausea/Vomiting: None    Postoperative hydration reviewed and adequate. Pain:  Pain Scale 1: Numeric (0 - 10) (07/26/18 0304)  Pain Intensity 1: 0 (07/26/18 0304)   Managed    Neurological Status:   Neuro (WDL): Within Defined Limits (07/26/18 0004)   At baseline    Mental Status and Level of Consciousness: Arousable    Pulmonary Status:   O2 Device: Room air (07/26/18 0235)   Adequate oxygenation and airway patent    Complications related to anesthesia: None    Post-anesthesia assessment completed.  No concerns    Signed By: Kingsley Mckoy MD     July 26, 2018

## 2018-07-26 NOTE — PROGRESS NOTES
Pt has been observed for 2 hours. Recheck of her cervix reveals it to be 4cm/90%/vtx/bulging bag. Will admit pt for labor management. Dr. Yarely Reeves notified.

## 2018-07-26 NOTE — PROGRESS NOTES
Pt up to bathroom. Voided. Bleeding appropriate. IV heplocked and epidural removed with blue tip intact.  Pt preparing for transfer to MIU

## 2018-07-26 NOTE — LACTATION NOTE

## 2018-07-26 NOTE — PROGRESS NOTES
Admission assessment complete, see doc flowsheets. Patient alert and oriented x 4.  Respirations even and unlabored on room air.  Lung sounds CTA.  Heart sounds S1 and S2 auscultated with a regular rate.  Abdomen soft and non-tender.  Bowel sounds active in all four quadrants.  Patient denies pain and other needs at this time.  Patient given information packet and educated on Shaken Baby Syndrome, consent signed by the mother and placed in the infant's chart. Mother educated on TDAP and Hep B vaccines. All questions answered. Patient encouraged to call for assistance out of bed for the first time. Call light within reach, bed wheels locked, bed in low position, and side rails up x 2. Family at bedside.

## 2018-07-26 NOTE — L&D DELIVERY NOTE
Delivery Summary    Patient: Arielle Barbour MRN: [id number]  SSN: [id number]    YOB: 1990  Age: 32 y.o. Sex: female       Information for the patient's :  Stacie Deleon [[id number]]       Labor Events:    Labor: No   Rupture Date: 2018   Rupture Time: 10:40 PM   Rupture Type AROM   Amniotic Fluid Volume: Large    Amniotic Fluid Description: Meconium None   Induction: None       Augmentation: Oxytocin   Labor Events: None     Cervical Ripening:     None     Delivery Events:  Episiotomy: None   Laceration(s): Second degree perineal     Repaired: Yes    Number of Repair Packets:     Suture Type and Size: Vicryl 3-0     Estimated Blood Loss (ml): 300ml       Delivery Date: 2018    Delivery Time: 11:59 PM  Delivery Type: Vaginal, Spontaneous Delivery  Sex:  Female     Gestational Age: 38w3d   Delivery Clinician:     Living Status:     Delivery Location:              APGARS  One minute Five minutes Ten minutes   Skin color:            Heart rate:            Grimace:            Muscle tone:            Breathing: Totals:                Presentation: Vertex    Position: Right Occiput Anterior  Resuscitation Method:        Meconium Stained:        Cord Vessels: 3 Vessels      Cord Events:    Cord Blood Sent?:  Yes    Blood Gases Sent?:  Yes    Placenta:  Date/Time:    Removal: Expressed      Appearance: Normal      Measurements:  Birth Weight: 8 lb 9.7 oz (3.905 kg)      Birth Length:        Head Circumference:        Chest Circumference:       Abdominal Girth:       Other Providers:    , Obstetrician;Primary Nurse;Primary  Nurse;Nicu Nurse;Neonatologist;Anesthesiologist;Crna;Nurse Practitioner;Midwife;Nursery Nurse           Group B Strep:   Lab Results   Component Value Date/Time    GrBStrep, External Negative 2018     Information for the patient's :  Stacie Deleon [[id number]]   No results found for: Joselyn Gram, PCTDIG, BILI, ABORHEXT, [address]    No results found for: APH, APCO2, APO2, AHCO3, ABEC, ABDC, O2ST, EPHV, PCO2V, PO2V, HCO3V, EBEV, EBDV, SITE, RSCOM       Several sets of pushes w/ out progress initially. Baby noted to be OP. Rotated w/out difficulty to [place]. Baby progressed to +2 with next push thereafter. NICU called for moderate meconium.  of a VFI at 2359 on 18 Apgars 9, 9  C/C/+2-->pushed to deliver head JESREY onto intact perineum. Body followed easily thereafter. Cord cut/clamped, baby to mom. Cord gases were drawn. Placenta delivered S/I/3VC. 2nd deg lac noted and repaired in typical fashion. Bladder emptied. FF w/ pit and massage. EBL 30cc. Mom/baby stable.          Yamil Nelson MD

## 2018-07-26 NOTE — ANESTHESIA PROCEDURE NOTES
Epidural Block    Start time: 7/25/2018 9:27 PM  End time: 7/25/2018 9:32 PM  Performed by: Chanda Powell by: Mark ALFARO     Pre-Procedure  Indication: labor epidural    Preanesthetic Checklist: patient identified, risks and benefits discussed, anesthesia consent, site marked, patient being monitored, timeout performed and anesthesia consent    Timeout Time: 21:27        Epidural:   Patient position:  Seated  Prep region:  Lumbar  Prep: Chlorhexidine    Location:  L3-4    Needle and Epidural Catheter:   Needle Type:  Tuohy  Needle Gauge:  17 G  Injection Technique:  Loss of resistance using air  Attempts:  1  Catheter Size:  18 G  Depth in Epidural Space (cm):  3  Events: no blood with aspiration, no cerebrospinal fluid with aspiration, no paresthesia and negative aspiration test    Test Dose:  Lidocaine 1.5% w/ epi and negative (.75% LIDOCAINE WITH EPI)    Assessment:   Catheter Secured:  Tegaderm and tape  Insertion:  Uncomplicated  Patient tolerance:  Patient tolerated the procedure well with no immediate complications

## 2018-07-26 NOTE — PROGRESS NOTES
Baby to warmer. Neonatologist at bedside evaluating . Apgars 9 and 9. Weighed measured and banded. To mom to breastfeed. Offered assurance to patient. 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

## 2018-07-26 NOTE — PROGRESS NOTES
Patient admitted to room 434 for labor. EFM/TOCO tested and applied. Abdomen palpated soft and non-tender. +FM.

## 2018-07-26 NOTE — PROGRESS NOTES
Labor Progress Note Continue Labor  Patient seen, fetal heart rate and contraction pattern evaluated, patient examined. Pt comfortable s/p CAESAR. Patient Vitals for the past 8 hrs:   BP Temp Pulse Resp Height   18 104/60 - 100  -   18 112/57 - 90  -   18 105/65 - 99 18 112/60 - 93 18 109/62 - 92 18 110/63 - (!) 101 18 109/61 - (!) 104 18 108/58 - 93 180 113/62 - 94 18 118/56 - (!) 103 18 111/66 - (!) 108 18 107/57 - (!) 107 18 124/66 - 97 18 2137 131/74 - 98  -   18 2136 135/80 - 98 18 2135 135/75 - 93 18 2133 136/74 - (!) 101 18 2121 130/71 - 88  -   18 2055 162/83 - 90 18 1724 - - - - 5' 3\" (1.6 m)   18 1723 124/70 98.1 °F (36.7 °C) 90 18 -         FHTs:  140/mod/+accels/+occas variable decels   [place]: Q2min    SVE:  8/80/-1-0     Assessment/Plan:  32 y. o. at 39w3d in AL     1) AL:  Progressing well on her own; AROM this check w/ light mec. Peanut. 2) FHTs:  Cat II--overall reassuring w/ great variability.   Watching closely  3) GBS:  Thania Palm MD

## 2018-07-26 NOTE — PROGRESS NOTES
Dr. Burciaga [place] at bedside. AROM with light meconium. In and out cath for 200cc of clear yellow urine.      SVE 8/80/-1

## 2018-07-26 NOTE — PROGRESS NOTES
SBAR IN Report: Mother    Verbal report received from Mariana Fine RN on this patient, who is now being transferred from L&D for routine progression of care. The patient is not wearing a green \"Anesthesia-Duramorph\" band. Report consisted of patient's Situation, Background, Assessment and Recommendations (SBAR).  ID bands were compared with the identification form, and verified with the patient and transferring nurse. Information from the SBAR and the [place] Report was reviewed with the transferring nurse; opportunity for questions and clarification provided.

## 2018-07-26 NOTE — PROGRESS NOTES
Chart reviewed - no needs identified.  made introduction to family and provided informational packet on  mood disorder education/resources. Family receptive to receiving information and denied any additional needs from . Family has this 's contact information should any needs/questions arise.     Karin Ely, [address] [facility]

## 2018-07-26 NOTE — LACTATION NOTE
Discussed pt's choice of infant feeding method. Feeding options explored, including the importance of exclusive breastfeeding. Pt informed of our breastfeeding support system from from nursing staff and lactation staff during inpatient stay and following discharge. Questions and concerns addressed at this time. Pt confirms breastfeeding is her decision at this time. Amy Ernst

## 2018-07-27 VITALS
HEART RATE: 66 BPM | DIASTOLIC BLOOD PRESSURE: 55 MMHG | SYSTOLIC BLOOD PRESSURE: 116 MMHG | HEIGHT: 63 IN | OXYGEN SATURATION: 100 % | TEMPERATURE: 98.1 F | RESPIRATION RATE: 16 BRPM

## 2018-07-27 PROCEDURE — 74011250637 HC RX REV CODE- 250/637: Performed by: OBSTETRICS & GYNECOLOGY

## 2018-07-27 RX ORDER — IBUPROFEN 800 MG/1
800 TABLET ORAL
Qty: 60 TAB | Refills: 2 | Status: SHIPPED | OUTPATIENT
Start: 2018-07-27 | End: 2018-12-12

## 2018-07-27 RX ADMIN — IBUPROFEN 800 MG: 800 TABLET ORAL at 04:23

## 2018-07-27 NOTE — PROGRESS NOTES
Shift assessment complete via doc flowsheets.  Patient alert and oriented x 4.  Respirations even and unlabored on room air.  Lung sounds CTA.  Heart sounds S1 and S2 auscultated with a regular rate.  Abdomen soft but tender. Patient reports passing flatus. Fundus firm at umbilicus - 1, midline, small rubra on alban-pad.  Bowel sounds active in all four quadrants.  Patient given Motrin PO for patient reported pain 5/10 in abdomen. See MAR. Patient denies other needs at this time.  Call light within reach, bed wheels locked, bed in low position, and side rails up x 2.  Patient encouraged to call for assistance. Family at bedside.

## 2018-07-27 NOTE — PROGRESS NOTES
Post-Partum Day Number 2 Progress/Discharge Note Patient doing well post-partum without significant complaint. Voiding without difficulty, normal lochia. Vitals:  Patient Vitals for the past 8 hrs: 
 BP Temp Pulse Resp  
18 0715 116/55 98.1 °F (36.7 °C) 66 16 Temp (24hrs), Av.1 °F (36.7 °C), Min:98 °F (36.7 °C), Max:98.1 °F (36.7 °C) Vital signs stable, afebrile. Exam:  Patient without distress. Abdomen soft, fundus firm at level of umbilicus, non tender Lower extremities are negative for swelling, cords or tenderness. Lab/Data Review: All lab results for the last 24 hours reviewed. Assessment and Plan:  Patient appears to be having uncomplicated post-partum course. Continue routine perineal care and maternal education. Plan discharge for today with follow up in our office in 6 weeks.

## 2018-07-27 NOTE — DISCHARGE INSTRUCTIONS
Vaginal Childbirth: Care Instructions  Your Care Instructions    Your body will slowly heal in the next few weeks. It is easy to get too tired and overwhelmed during the first weeks after your baby is born. Changes in your hormones can shift your mood without warning. You may find it hard to meet the extra demands on your energy and time. Take it easy on yourself. Follow-up care is a key part of your treatment and safety. Be sure to make and go to all appointments, and call your doctor if you are having problems. It's also a good idea to know your test results and keep a list of the medicines you take. How can you care for yourself at home? · Vaginal bleeding and cramps  ¨ After delivery, you will have a bloody discharge from the vagina. This will turn pink within a week and then white or yellow after about 10 days. It may last for 2 to 4 weeks or longer, until the uterus has healed. Use pads instead of tampons until you stop bleeding. ¨ Do not worry if you pass some blood clots, as long as they are smaller than a golf ball. If you have a tear or stitches in your vaginal area, change the pad at least every 4 hours to prevent soreness and infection. ¨ You may have cramps for the first few days after childbirth. These are normal and occur as the uterus shrinks to normal size. Take an over-the-counter pain medicine, such as acetaminophen (Tylenol), ibuprofen (Advil, Motrin), or naproxen (Aleve), for cramps. Read and follow all instructions on the label. Do not take aspirin, because it can cause more bleeding. ¨ Do not take two or more pain medicines at the same time unless the doctor told you to. Many pain medicines have acetaminophen, which is Tylenol. Too much acetaminophen (Tylenol) can be harmful. · Stitches  ¨ If you have stitches, they will dissolve on their own and do not need to be removed. Follow your doctor's instructions for cleaning the stitched area.   ¨ Put ice or a cold pack on your painful area for 10 to 20 minutes at a time, several times a day, for the first few days. Put a thin cloth between the ice and your skin. ¨ Sit in a few inches of warm water (sitz bath) 3 times a day and after bowel movements. The warm water helps with pain and itching. If you do not have a tub, a warm shower might help. · Breast fullness  ¨ Your breasts may overfill (engorge) in the first few days after delivery. To help milk flow and to relieve pain, warm your breasts in the shower or by using warm, moist towels before nursing. ¨ If you are not nursing, do not put warmth on your breasts or touch your breasts. Wear a tight bra or sports bra and use ice until the fullness goes away. This usually takes 2 to 3 days. ¨ Put ice or a cold pack on your breast after nursing to reduce swelling and pain. Put a thin cloth between the ice and your skin. · Activity  ¨ Eat a balanced diet. Do not try to lose weight by cutting calories. Keep taking your prenatal vitamins, or take a multivitamin. ¨ Get as much rest as you can. Try to take naps when your baby sleeps during the day. ¨ Get some exercise every day. But do not do any heavy exercise until your doctor says it is okay. ¨ Wait until you are healed (about 4 to 6 weeks) before you have sexual intercourse. Your doctor will tell you when it is okay to have sex. ¨ Talk to your doctor about birth control. You can get pregnant even before your period returns. Also, you can get pregnant while you are breastfeeding. · Mental health  ¨ It is normal to have some sadness, anxiety, sleeplessness, and mood swings after you go home. If you feel upset or hopeless for more than a few days or are having trouble doing the things you need to do, talk to your doctor. · Constipation and hemorrhoids  ¨ Drink plenty of fluids, enough so that your urine is light yellow or clear like water.  If you have kidney, heart, or liver disease and have to limit fluids, talk with your doctor before you increase the amount of fluids you drink. ¨ Eat plenty of fiber each day. Have a bran muffin or bran cereal for breakfast, and try eating a piece of fruit for a mid-afternoon snack. ¨ For painful, itchy hemorrhoids, put ice or a cold pack on the area several times a day for 10 minutes at a time. Follow this by putting a warm compress on the area for another 10 to 20 minutes or by sitting in a shallow, warm bath. When should you call for help? Call 911 anytime you think you may need emergency care. For example, call if:    · You passed out (lost consciousness).    Call your doctor now or seek immediate medical care if:    · You have severe vaginal bleeding.     · You are dizzy or lightheaded, or you feel like you may faint.     · You have a fever.     · You have new or more pain in your belly or pelvis.    Watch closely for changes in your health, and be sure to contact your doctor if:    · Your vaginal bleeding seems to be getting heavier.     · You have new or worse vaginal discharge.     · You feel sad, anxious, or hopeless for more than a few days.     · You do not get better as expected. Where can you learn more? Go to http://ruslan-stella.info/. Enter M382 in the search box to learn more about \"Vaginal Childbirth: Care Instructions. \"  Current as of: November 21, 2017  Content Version: 11.7  © 6682-9496 [employer]. Care instructions adapted under license by [employer] (which disclaims liability or warranty for this information). If you have questions about a medical condition or this instruction, always ask your healthcare professional. [place] [address] any warranty or liability for your use of this information.

## 2018-07-27 NOTE — LACTATION NOTE
In to see mom and infant for discharge. Mom states she wants to just do pump and bottle feed at home, expressed breast milk and formula. Does not plan to put to breast. Completed paperwork for hospital grade pump rental per mom's request. Reviewed importance to pump at least 8 times per day for full milk supply. Feed 8-12 full feeds per day, monitor output. Reviewed how to manage period of engorgement. No further needs at this time.

## 2018-07-27 NOTE — PROGRESS NOTES
Discharge teaching complete. Pt verbalized understanding, questions encouraged. .  Pt. Stable and discharged to home per MD order. Pt. Left unit via ambulate with family ,  in carseat. [place] escorted off unit by [facility] staff. Pt. To home via private automobile.

## 2018-07-27 NOTE — DISCHARGE SUMMARY
Obstetrical Discharge Summary     Name: Jeffy León MRN: [id number]  SSN: [id number]    YOB: 1990  Age: 32 y.o. Sex: female      Allergies: Review of patient's allergies indicates no known allergies. Admit Date: 2018    Discharge Date: 2018     Admitting Physician: Hayley Iniguez MD     Attending Physician:  Chel Soto MD     * Admission Diagnoses: labor;Premature uterine contractions, antepartum, third tri*    * Discharge Diagnoses:   Information for the patient's :  Tanna Brink [[id number]]   Delivery of a 8 lb 9.7 oz (3.905 kg) female infant via Vaginal, Spontaneous Delivery on 2018 at 11:59 PM  by . Apgars were 9 and 9. Additional Diagnoses:   Hospital Problems as of 2018  Date Reviewed: 2018          Codes Class Noted - Resolved POA    Premature uterine contractions, antepartum, third trimester ICD-10-CM: O47.03  ICD-9-CM: 644.03  2018 - Present No        * (Principal)Normal labor ICD-10-CM: O80, Z37.9  ICD-9-CM: 650  2018 - Present Yes             Lab Results   Component Value Date/Time    ABO/Rh(D) O POSITIVE 2018 08:42 PM    Rubella, External Immune 01/15/2018    GrBStrep, External Negative 2018    ABO,Rh O positive 01/15/2018      Immunization History   Administered Date(s) Administered    Influenza Vaccine 10/13/2016    Tdap 2016, 2018       * Procedures:   * No surgery found *           * Discharge Condition: good    * Hospital Course: Normal hospital course following the delivery. * Disposition: Home    Discharge Medications:   Current Discharge Medication List      START taking these medications    Details   ibuprofen (MOTRIN) 800 mg tablet Take 1 Tab by mouth every six (6) hours as needed for Pain. Qty: 60 Tab, Refills: 2         CONTINUE these medications which have NOT CHANGED    Details   prenatal no. 25-iron-FA #6-dha 30 mg iron-1mg -200 mg cap Take  by mouth.  OTC [facility] w/DHA * Follow-up Care/Patient Instructions: Activity: Activity as tolerated and No sex for 6 weeks  Diet: Regular Diet  Wound Care: None needed    Follow-up Information     Follow up With Details Comments Contact Info    None   None ([phone]) Patient stated that they have no PCP         Follow up with Dr. Imelda Olvera in 6 weeks at [facility].     Signed By:  Jennifer Abreu MD     July 27, 2018
